# Patient Record
Sex: FEMALE | Race: WHITE | Employment: OTHER | URBAN - NONMETROPOLITAN AREA
[De-identification: names, ages, dates, MRNs, and addresses within clinical notes are randomized per-mention and may not be internally consistent; named-entity substitution may affect disease eponyms.]

---

## 2021-03-22 ENCOUNTER — HOSPITAL ENCOUNTER (EMERGENCY)
Age: 41
Discharge: HOME | End: 2021-03-22
Attending: EMERGENCY MEDICINE
Payer: MEDICARE

## 2021-03-22 ENCOUNTER — APPOINTMENT (OUTPATIENT)
Dept: GENERAL RADIOLOGY | Age: 41
End: 2021-03-22
Payer: MEDICARE

## 2021-03-22 VITALS
HEIGHT: 64 IN | HEART RATE: 78 BPM | SYSTOLIC BLOOD PRESSURE: 162 MMHG | DIASTOLIC BLOOD PRESSURE: 98 MMHG | BODY MASS INDEX: 23.9 KG/M2 | OXYGEN SATURATION: 99 % | TEMPERATURE: 98.4 F | WEIGHT: 140 LBS | RESPIRATION RATE: 16 BRPM

## 2021-03-22 DIAGNOSIS — S50.11XA CONTUSION OF RIGHT FOREARM, INITIAL ENCOUNTER: ICD-10-CM

## 2021-03-22 DIAGNOSIS — Z87.891 FORMER SMOKER: ICD-10-CM

## 2021-03-22 DIAGNOSIS — R03.0 ELEVATED BLOOD PRESSURE READING: ICD-10-CM

## 2021-03-22 DIAGNOSIS — M79.601 RIGHT ARM PAIN: Primary | ICD-10-CM

## 2021-03-22 PROCEDURE — 73090 X-RAY EXAM OF FOREARM: CPT

## 2021-03-22 PROCEDURE — 99283 EMERGENCY DEPT VISIT LOW MDM: CPT

## 2021-03-22 PROCEDURE — 6370000000 HC RX 637 (ALT 250 FOR IP): Performed by: EMERGENCY MEDICINE

## 2021-03-22 PROCEDURE — 29125 APPL SHORT ARM SPLINT STATIC: CPT

## 2021-03-22 RX ORDER — IBUPROFEN 800 MG/1
800 TABLET ORAL EVERY 8 HOURS PRN
Qty: 21 TABLET | Refills: 0 | Status: SHIPPED | OUTPATIENT
Start: 2021-03-22 | End: 2021-03-29

## 2021-03-22 RX ORDER — TRAZODONE HYDROCHLORIDE 150 MG/1
150 TABLET ORAL NIGHTLY
COMMUNITY

## 2021-03-22 RX ORDER — IBUPROFEN 400 MG/1
800 TABLET ORAL ONCE
Status: COMPLETED | OUTPATIENT
Start: 2021-03-22 | End: 2021-03-22

## 2021-03-22 RX ADMIN — IBUPROFEN 800 MG: 400 TABLET, FILM COATED ORAL at 19:44

## 2021-03-22 ASSESSMENT — PAIN SCALES - GENERAL
PAINLEVEL_OUTOF10: 8
PAINLEVEL_OUTOF10: 8

## 2021-03-22 ASSESSMENT — PAIN DESCRIPTION - PAIN TYPE: TYPE: ACUTE PAIN

## 2021-03-22 ASSESSMENT — PAIN DESCRIPTION - LOCATION: LOCATION: ARM

## 2021-03-23 NOTE — ED PROVIDER NOTES
activity: Yes   Lifestyle    Physical activity     Days per week: Not on file     Minutes per session: Not on file    Stress: Not on file   Relationships    Social connections     Talks on phone: Not on file     Gets together: Not on file     Attends Congregational service: Not on file     Active member of club or organization: Not on file     Attends meetings of clubs or organizations: Not on file     Relationship status: Not on file    Intimate partner violence     Fear of current or ex partner: Not on file     Emotionally abused: Not on file     Physically abused: Not on file     Forced sexual activity: Not on file   Other Topics Concern    Not on file   Social History Narrative    Not on file     No current facility-administered medications for this encounter. Current Outpatient Medications   Medication Sig Dispense Refill    traZODone (DESYREL) 150 MG tablet Take 150 mg by mouth nightly      ibuprofen (ADVIL;MOTRIN) 800 MG tablet Take 1 tablet by mouth every 8 hours as needed for Pain With food 21 tablet 0     No Known Allergies    REVIEW OF SYSTEMS  10 systems reviewed, pertinent positives per HPI otherwise noted to be negative. PHYSICAL EXAM  Vitals:    03/22/21 1736 03/22/21 1958   BP: (!) 140/97 (!) 162/98   Pulse: 79 78   Resp: 18 16   Temp: 98.4 °F (36.9 °C)    TempSrc: Oral    SpO2: 99% 99%   Weight: 140 lb (63.5 kg)    Height: 5' 4\" (1.626 m)      GENERAL APPEARANCE: Awake and alert. Cooperative. No acute distress  HEAD: Normocephalic. Atraumatic. EYES: PERRL. EOM's grossly intact. ENT: Mucous membranes are moist.  Airway patent, no stridor, no otorrhea or rhinorrhea  NECK: Supple. No rigidity, range of motion  HEART: RRR. No murmurs  LUNGS: Respirations nonlabored. Lungs are clear to auscultation bilaterally. ABDOMEN: Soft. Non-distended. Non-tender. No guarding or rebound. Normal bowel sounds.   EXTREMITIES: No peripheral edema except over the proximal ulnar aspect of right forearm, slight ecchymosis, tenderness to palpation, appears to be a small hematoma,. Moves all extremities equally except guarding the right forearm. 2+ radial pulse, distal sensation intact in all digits, less than 2-second cap refill in all digits, hand tendon exam intact, she does have some pain that radiates to the right forearm with range of motion of the wrist and elbow, but no bony point tenderness of the wrist or elbow itself, no obvious deformity  SKIN: Warm and dry. No acute rashes. NEUROLOGICAL: Alert and oriented. No gross facial drooping. Strength 5/5, sensation intact. No truncal ataxia. Normal speech, steady gait  PSYCHIATRIC: Normal mood and affect. RADIOLOGY  Xr Radius Ulna Right (2 Views)    Result Date: 3/22/2021  EXAMINATION: TWO XRAY VIEWS OF THE RIGHT FOREARM 3/22/2021 6:31 pm COMPARISON: None. HISTORY: ORDERING SYSTEM PROVIDED HISTORY: pain/fall TECHNOLOGIST PROVIDED HISTORY: Reason for exam:->pain/fall Reason for Exam: Fall Acuity: Acute Type of Exam: Initial FINDINGS: The bone mineralization is within normal limits. The joint spaces appear unremarkable. No acute fractures or dislocations are seen. There is no soft tissue swelling. No bony erosions are seen. 1. No acute abnormality involving the right forearm. ED COURSE/MDM  Patient seen and evaluated. Old records reviewed. Labs and imaging reviewed and results discussed with patient.    43-year-old female status post fall with right forearm injury, no acute process on x-ray, appears to be a contusion of the ulnar aspect of the forearm, given ulnar gutter splint to protect the area as needed for comfort, ice pack provided, extremity is neurovascularly intact, low suspicion for compartment syndrome, no current concern for cellulitis/abscess or infectious process, no current concern for septic joint, no actual joint injury, given ibuprofen for pain, she states she also has Tylenol at home, encouraged primary care follow-up, states she quit smoking a couple days ago, but blood pressure was elevated, she is not on any blood pressure medication at this time, but low suspicion for endorgan damage, and orthopedic follow-up as needed, she is already on gabapentin and Suboxone as well, strict return precautions given, all questions answered, will return if any worsening symptoms or new concerns, see AVS for further discharge information, patient verbalized understanding of plan, felt comfortable going home. Orders Placed This Encounter   Procedures    XR RADIUS ULNA RIGHT (2 VIEWS)    Referral for No Primary Care Physician - Urgent    Ulnar Gutter OCL Splint arm    Apply ice     Orders Placed This Encounter   Medications    ibuprofen (ADVIL;MOTRIN) tablet 800 mg    ibuprofen (ADVIL;MOTRIN) 800 MG tablet     Sig: Take 1 tablet by mouth every 8 hours as needed for Pain With food     Dispense:  21 tablet     Refill:  0          CLINICAL IMPRESSION  1. Right arm pain    2. Contusion of right forearm, initial encounter    3. Former smoker    4. Elevated blood pressure reading        Blood pressure (!) 162/98, pulse 78, temperature 98.4 °F (36.9 °C), temperature source Oral, resp. rate 16, height 5' 4\" (1.626 m), weight 140 lb (63.5 kg), SpO2 99 %. Rhea Beckford was discharged to home in stable condition.                               Nitesh Castelan,   03/29/21 0110

## 2021-03-23 NOTE — ED NOTES
Pt discharge instructions and rx x 1 reviewed with pt. Pt verbalized understanding. No further needs. Pt discharged at this time.         Jillian Montaño RN  03/22/21 2002